# Patient Record
Sex: MALE | Race: WHITE | NOT HISPANIC OR LATINO | Employment: UNEMPLOYED | URBAN - NONMETROPOLITAN AREA
[De-identification: names, ages, dates, MRNs, and addresses within clinical notes are randomized per-mention and may not be internally consistent; named-entity substitution may affect disease eponyms.]

---

## 2021-06-08 ENCOUNTER — APPOINTMENT (OUTPATIENT)
Dept: GENERAL RADIOLOGY | Facility: HOSPITAL | Age: 21
End: 2021-06-08

## 2021-06-08 ENCOUNTER — HOSPITAL ENCOUNTER (EMERGENCY)
Facility: HOSPITAL | Age: 21
Discharge: HOME | End: 2021-06-08
Attending: EMERGENCY MEDICINE | Admitting: EMERGENCY MEDICINE

## 2021-06-08 VITALS
OXYGEN SATURATION: 98 % | RESPIRATION RATE: 19 BRPM | WEIGHT: 223.4 LBS | HEART RATE: 70 BPM | SYSTOLIC BLOOD PRESSURE: 145 MMHG | TEMPERATURE: 98.6 F | HEIGHT: 69 IN | DIASTOLIC BLOOD PRESSURE: 88 MMHG | BODY MASS INDEX: 33.09 KG/M2

## 2021-06-08 DIAGNOSIS — R05.9 COUGH: Primary | ICD-10-CM

## 2021-06-08 DIAGNOSIS — J06.9 UPPER RESPIRATORY TRACT INFECTION, UNSPECIFIED TYPE: ICD-10-CM

## 2021-06-08 LAB
ALBUMIN SERPL-MCNC: 5.3 G/DL (ref 3.5–5.2)
ALBUMIN/GLOB SERPL: 2.1 G/DL
ALP SERPL-CCNC: 79 U/L (ref 39–117)
ALT SERPL W P-5'-P-CCNC: 19 U/L (ref 1–41)
ANION GAP SERPL CALCULATED.3IONS-SCNC: 18 MMOL/L (ref 5–15)
AST SERPL-CCNC: 17 U/L (ref 1–40)
B PARAPERT DNA SPEC QL NAA+PROBE: NOT DETECTED
B PERT DNA SPEC QL NAA+PROBE: NOT DETECTED
BASOPHILS # BLD AUTO: 0.07 10*3/MM3 (ref 0–0.2)
BASOPHILS NFR BLD AUTO: 0.8 % (ref 0–1.5)
BILIRUB SERPL-MCNC: 1 MG/DL (ref 0–1.2)
BUN SERPL-MCNC: 6 MG/DL (ref 6–20)
BUN/CREAT SERPL: 6.5 (ref 7–25)
C PNEUM DNA NPH QL NAA+NON-PROBE: NOT DETECTED
CALCIUM SPEC-SCNC: 9.9 MG/DL (ref 8.6–10.5)
CHLORIDE SERPL-SCNC: 101 MMOL/L (ref 98–107)
CO2 SERPL-SCNC: 21 MMOL/L (ref 22–29)
CREAT SERPL-MCNC: 0.92 MG/DL (ref 0.76–1.27)
DEPRECATED RDW RBC AUTO: 36.8 FL (ref 37–54)
EOSINOPHIL # BLD AUTO: 0.04 10*3/MM3 (ref 0–0.4)
EOSINOPHIL NFR BLD AUTO: 0.4 % (ref 0.3–6.2)
ERYTHROCYTE [DISTWIDTH] IN BLOOD BY AUTOMATED COUNT: 11.4 % (ref 12.3–15.4)
FLUAV SUBTYP SPEC NAA+PROBE: NOT DETECTED
FLUBV RNA ISLT QL NAA+PROBE: NOT DETECTED
GFR SERPL CREATININE-BSD FRML MDRD: 106 ML/MIN/1.73
GLOBULIN UR ELPH-MCNC: 2.5 GM/DL
GLUCOSE SERPL-MCNC: 111 MG/DL (ref 65–99)
HADV DNA SPEC NAA+PROBE: NOT DETECTED
HCOV 229E RNA SPEC QL NAA+PROBE: NOT DETECTED
HCOV HKU1 RNA SPEC QL NAA+PROBE: NOT DETECTED
HCOV NL63 RNA SPEC QL NAA+PROBE: NOT DETECTED
HCOV OC43 RNA SPEC QL NAA+PROBE: NOT DETECTED
HCT VFR BLD AUTO: 52.3 % (ref 37.5–51)
HGB BLD-MCNC: 18.2 G/DL (ref 13–17.7)
HMPV RNA NPH QL NAA+NON-PROBE: NOT DETECTED
HPIV1 RNA SPEC QL NAA+PROBE: NOT DETECTED
HPIV2 RNA SPEC QL NAA+PROBE: NOT DETECTED
HPIV3 RNA NPH QL NAA+PROBE: NOT DETECTED
HPIV4 P GENE NPH QL NAA+PROBE: NOT DETECTED
IMM GRANULOCYTES # BLD AUTO: 0.04 10*3/MM3 (ref 0–0.05)
IMM GRANULOCYTES NFR BLD AUTO: 0.4 % (ref 0–0.5)
LIPASE SERPL-CCNC: 21 U/L (ref 13–60)
LYMPHOCYTES # BLD AUTO: 1.73 10*3/MM3 (ref 0.7–3.1)
LYMPHOCYTES NFR BLD AUTO: 18.6 % (ref 19.6–45.3)
M PNEUMO IGG SER IA-ACNC: NOT DETECTED
MCH RBC QN AUTO: 30.7 PG (ref 26.6–33)
MCHC RBC AUTO-ENTMCNC: 34.8 G/DL (ref 31.5–35.7)
MCV RBC AUTO: 88.2 FL (ref 79–97)
MONOCYTES # BLD AUTO: 0.64 10*3/MM3 (ref 0.1–0.9)
MONOCYTES NFR BLD AUTO: 6.9 % (ref 5–12)
NEUTROPHILS NFR BLD AUTO: 6.8 10*3/MM3 (ref 1.7–7)
NEUTROPHILS NFR BLD AUTO: 72.9 % (ref 42.7–76)
NRBC BLD AUTO-RTO: 0 /100 WBC (ref 0–0.2)
NT-PROBNP SERPL-MCNC: 12.9 PG/ML (ref 0–450)
PLATELET # BLD AUTO: 208 10*3/MM3 (ref 140–450)
PMV BLD AUTO: 12.2 FL (ref 6–12)
POTASSIUM SERPL-SCNC: 3.4 MMOL/L (ref 3.5–5.2)
PROT SERPL-MCNC: 7.8 G/DL (ref 6–8.5)
RBC # BLD AUTO: 5.93 10*6/MM3 (ref 4.14–5.8)
RHINOVIRUS RNA SPEC NAA+PROBE: NOT DETECTED
RSV RNA NPH QL NAA+NON-PROBE: NOT DETECTED
SARS-COV-2 RNA NPH QL NAA+NON-PROBE: NOT DETECTED
SODIUM SERPL-SCNC: 140 MMOL/L (ref 136–145)
TROPONIN T SERPL-MCNC: <0.01 NG/ML (ref 0–0.03)
WBC # BLD AUTO: 9.32 10*3/MM3 (ref 3.4–10.8)

## 2021-06-08 PROCEDURE — 83880 ASSAY OF NATRIURETIC PEPTIDE: CPT | Performed by: PHYSICIAN ASSISTANT

## 2021-06-08 PROCEDURE — 84484 ASSAY OF TROPONIN QUANT: CPT | Performed by: PHYSICIAN ASSISTANT

## 2021-06-08 PROCEDURE — 83690 ASSAY OF LIPASE: CPT | Performed by: PHYSICIAN ASSISTANT

## 2021-06-08 PROCEDURE — 93005 ELECTROCARDIOGRAM TRACING: CPT | Performed by: PHYSICIAN ASSISTANT

## 2021-06-08 PROCEDURE — 99283 EMERGENCY DEPT VISIT LOW MDM: CPT

## 2021-06-08 PROCEDURE — 71045 X-RAY EXAM CHEST 1 VIEW: CPT

## 2021-06-08 PROCEDURE — 0202U NFCT DS 22 TRGT SARS-COV-2: CPT | Performed by: PHYSICIAN ASSISTANT

## 2021-06-08 PROCEDURE — 85025 COMPLETE CBC W/AUTO DIFF WBC: CPT | Performed by: PHYSICIAN ASSISTANT

## 2021-06-08 PROCEDURE — 80053 COMPREHEN METABOLIC PANEL: CPT | Performed by: PHYSICIAN ASSISTANT

## 2021-06-08 RX ORDER — PREDNISONE 50 MG/1
50 TABLET ORAL DAILY
Qty: 5 TABLET | Refills: 0 | Status: SHIPPED | OUTPATIENT
Start: 2021-06-08 | End: 2021-06-13

## 2021-06-08 RX ORDER — ONDANSETRON 4 MG/1
4 TABLET, ORALLY DISINTEGRATING ORAL EVERY 6 HOURS PRN
Qty: 8 TABLET | Refills: 0 | Status: SHIPPED | OUTPATIENT
Start: 2021-06-08 | End: 2021-06-10

## 2021-06-08 RX ORDER — ALBUTEROL SULFATE 90 UG/1
2 AEROSOL, METERED RESPIRATORY (INHALATION) EVERY 4 HOURS PRN
Qty: 6.7 G | Refills: 0 | Status: SHIPPED | OUTPATIENT
Start: 2021-06-08

## 2021-06-09 NOTE — DISCHARGE INSTRUCTIONS
Smoking cessation is highly recommended. You likely have some inflammation of the bronchioles in your chest causing your symptoms.  May use albuterol 2 puffs every 4 hours as needed for chest tightness or wheezing.  Drink plenty of fluids to stay well-hydrated.  Take steroids to help with underlying inflammation.  Alternate ibuprofen and Tylenol as needed.  May use over-the-counter medicine such as Mucinex to help with chest congestion.  Use an extra as needed for nausea and vomiting.  You will need to follow-up with a primary care provider and may contact patient connection to establish care.  Return here to the ER for any change, worsening symptoms, or any additional concerns including but limited to severe or worsening shortness of breath, hemoptysis, syncope, chest pain, or any other symptoms.

## 2021-06-09 NOTE — ED PROVIDER NOTES
Subjective   Patient is a 20-year-old male with history of marijuana and tobacco use presenting to the ER for evaluation of cough and multiple complaints.  Patient states for the past few days he has had a productive cough with green and brown sputum.  He states he feels chest tightness and short of breath.  He states he had body aches yesterday.  He states he is also had some abdominal cramping and loose nonbloody stools.  He denies any known sick contacts.  He denies any known fever, chills, headache, syncopal episodes, hemoptysis, severe abdominal pain, dysuria, hematuria, leg pain or swelling, or any other symptoms.          Review of Systems   Constitutional: Negative for chills and fever.   HENT: Negative.    Eyes: Negative.    Respiratory: Positive for cough and chest tightness. Negative for shortness of breath.    Cardiovascular: Negative.    Gastrointestinal: Positive for diarrhea. Negative for abdominal pain and vomiting.   Genitourinary: Negative.    Musculoskeletal: Positive for myalgias. Negative for arthralgias.   Skin: Negative.    Allergic/Immunologic: Negative for immunocompromised state.   Neurological: Negative.    Psychiatric/Behavioral: Negative.        History reviewed. No pertinent past medical history.    No Known Allergies    History reviewed. No pertinent surgical history.    History reviewed. No pertinent family history.    Social History     Socioeconomic History   • Marital status: Single     Spouse name: Not on file   • Number of children: Not on file   • Years of education: Not on file   • Highest education level: Not on file   Tobacco Use   • Smoking status: Current Every Day Smoker     Packs/day: 1.00     Types: Cigarettes   Substance and Sexual Activity   • Alcohol use: Never   • Drug use: Yes     Types: Methamphetamines, Marijuana           Objective   Physical Exam  Vitals and nursing note reviewed.     /88 (Patient Position: Sitting)   Pulse 70   Temp 98.6 °F (37 °C)  "(Oral)   Resp 19   Ht 175.3 cm (69\")   Wt 101 kg (223 lb 6.4 oz)   SpO2 98%   BMI 32.99 kg/m²     GEN: No acute distress, sitting upright in stretcher.  Awake and alert.  Not appear septic or toxic.  Head: Normocephalic, atraumatic  Eyes: EOM intact  ENT: Mask in place per protocol  Cardiovascular: Regular rate and rhythm  Lungs: Clear to auscultation bilaterally that adventitious sounds  Abdomen: Soft, nontender, nondistended, no peritoneal signs, no guarding  Extremities: No edema, normal appearance full range of motion  Neuro: GCS 15  Psych: Mood and affect are appropriate    Procedures           ED Course  ED Course as of Jun 08 2337 Tue Jun 08, 2021 2144 Lipase: 21 [LA]   2151 Troponin T: <0.010 [LA]   2151 proBNP: 12.9 [LA]   2151 Glucose(!): 111 [LA]   2151 BUN: 6 [LA]   2151 Creatinine: 0.92 [LA]   2151 Sodium: 140 [LA]   2151 Potassium(!): 3.4 [LA]   2151 Chloride: 101 [LA]   2151 CO2(!): 21.0 [LA]   2151 Calcium: 9.9 [LA]   2151 Total Protein: 7.8 [LA]   2151 Albumin(!): 5.30 [LA]   2151 ALT (SGPT): 19 [LA]   2151 AST (SGOT): 17 [LA]   2151 Alkaline Phosphatase: 79 [LA]   2151 Total Bilirubin: 1.0 [LA]   2151 eGFR Non  Am: 106 [LA]   2151 Globulin: 2.5 [LA]   2151 BUN/Creatinine Ratio(!): 6.5 [LA]   2151 Anion Gap(!): 18.0 [LA]   2151 RN informed the patient was unable to get an IV but they did stick in for blood.    [LA]   2155 EKG interpreted by me.  Sinus rhythm.  Rate of 77.  Nonspecific T wave changes.  No ST segment abnormalities.  Abnormal EKG    [CG]   2158 Reviewed X-ray with Dr. Perales, did not see any acute cardiopulmonary abnormality.    [LA]   2256 ADENOVIRUS, PCR: Not Detected [LA]   2256 Coronavirus 229E: Not Detected [LA]   2256 Coronavirus HKU1: Not Detected [LA]   2256 Coronavirus NL63: Not Detected [LA]   2256 Coronavirus OC43: Not Detected [LA]   2256 COVID19: Not Detected [LA]   2256 Human Metapneumovirus: Not Detected [LA]   2256 Human Rhinovirus/Enterovirus: Not " Detected [LA]   2256 Influenza A PCR: Not Detected [LA]   2256 Influenza B PCR: Not Detected [LA]   2256 Parainfluenza Virus 1: Not Detected [LA]   2256 Parainfluenza Virus 2: Not Detected [LA]   2256 Parainfluenza Virus 3: Not Detected [LA]   2256 Parainfluenza Virus 4: Not Detected [LA]   2256 RSV, PCR: Not Detected [LA]   2256 Bordetella pertussis pcr: Not Detected [LA]   2256 Parainfluenza Virus 4: Not Detected [LA]   2256 RSV, PCR: Not Detected [LA]   2256 Bordetella pertussis pcr: Not Detected [LA]   2256 Bordetella parapertussis PCR: Not Detected [LA]   2256 Chlamydophila pneumoniae PCR: Not Detected [LA]   2256 Mycoplasma pneumo by PCR: Not Detected [LA]   2300 Discussed findings with patient and his mother.  We will treat as a bronchitis with steroids, albuterol.  Discussed over-the-counter medicine such as Mucinex.  Discussed importance of finding a primary care provider and following up.  Discussed very strict return precautions.  He verbalized understanding and was in agreement with this plan of care    [LA]      ED Course User Index  [CG] Santos Perales,   [LA] Hodan Jj PA-C                                           MDM  Number of Diagnoses or Management Options  Cough  Upper respiratory tract infection, unspecified type  Diagnosis management comments: On arrival, patient stable.  Differential includes bronchitis, cardiac dysrhythmia, pneumonia, electrolyte normalities, and other concerns.  Lower concern for again of ACS.  Will obtain basic labs, troponin, EKG, chest x-ray.  Will obtain respiratory panel.    Respiratory panel negative.  Patient had some hemoconcentration with an elevated hemoglobin but no leukocytosis.  Other labs were stable.  There is no elevation of troponin.  Chest x-ray reviewed without acute cardiopulmonary abnormality.  He remained stable here in the ER.  Discussed that he could have bronchitis which we will treat with steroids and inhaler.  Will give Zofran for  nausea and vomiting.  Discussed hydration with p.o. fluids, follow-up with primary care provider.  Discussed very strict return precautions.  He verbalized understanding and was in agreement with this plan of care       Amount and/or Complexity of Data Reviewed  Clinical lab tests: reviewed and ordered  Tests in the radiology section of CPT®: ordered and reviewed  Discussion of test results with the performing providers: yes  Review and summarize past medical records: yes  Discuss the patient with other providers: yes    Risk of Complications, Morbidity, and/or Mortality  Presenting problems: moderate  Diagnostic procedures: moderate  Management options: low    Patient Progress  Patient progress: stable      Final diagnoses:   Cough   Upper respiratory tract infection, unspecified type       ED Disposition  ED Disposition     ED Disposition Condition Comment    Discharge Stable           PATIENT CONNECTION - [facility] [address]  [phone]  Schedule an appointment as soon as possible for a visit            Medication List      New Prescriptions    albuterol sulfate  (90 Base) MCG/ACT inhaler  Commonly known as: PROVENTIL HFA;VENTOLIN HFA;PROAIR HFA  Inhale 2 puffs Every 4 (Four) Hours As Needed for Wheezing or Shortness of Air.     ondansetron ODT 4 MG disintegrating tablet  Commonly known as: ZOFRAN-ODT  Place 1 tablet on the tongue Every 6 (Six) Hours As Needed for Nausea or Vomiting for up to 2 days.     predniSONE 50 MG tablet  Commonly known as: DELTASONE  Take 1 tablet by mouth Daily for 5 days.           Where to Get Your Medications      These medications were sent to [facility] [place], [place] - [address] [facility] - [phone]  - [phone]   [address] [facility] [address] [facility] [address]    Phone: [phone]   · albuterol sulfate  (90 Base) MCG/ACT inhaler  · ondansetron ODT 4 MG disintegrating tablet  · predniSONE 50 MG tablet          Hodan Jj,  SINAN  06/08/21 1395